# Patient Record
Sex: FEMALE | Race: BLACK OR AFRICAN AMERICAN | NOT HISPANIC OR LATINO | Employment: UNEMPLOYED | URBAN - METROPOLITAN AREA
[De-identification: names, ages, dates, MRNs, and addresses within clinical notes are randomized per-mention and may not be internally consistent; named-entity substitution may affect disease eponyms.]

---

## 2022-03-28 ENCOUNTER — OFFICE VISIT (OUTPATIENT)
Dept: OPHTHALMOLOGY | Facility: CLINIC | Age: 77
End: 2022-03-28
Payer: COMMERCIAL

## 2022-03-28 DIAGNOSIS — H10.13 ALLERGIC CONJUNCTIVITIS OF BOTH EYES: ICD-10-CM

## 2022-03-28 DIAGNOSIS — H25.13 NUCLEAR SCLEROTIC CATARACT OF BOTH EYES: ICD-10-CM

## 2022-03-28 DIAGNOSIS — E11.9 DIABETES MELLITUS WITHOUT COMPLICATION: Primary | ICD-10-CM

## 2022-03-28 PROCEDURE — 99999 PR PBB SHADOW E&M-NEW PATIENT-LVL II: ICD-10-PCS | Mod: PBBFAC,HCWC,, | Performed by: OPTOMETRIST

## 2022-03-28 PROCEDURE — 92004 COMPRE OPH EXAM NEW PT 1/>: CPT | Mod: HCWC,S$GLB,, | Performed by: OPTOMETRIST

## 2022-03-28 PROCEDURE — 2023F DILAT RTA XM W/O RTNOPTHY: CPT | Mod: HCWC,CPTII,S$GLB, | Performed by: OPTOMETRIST

## 2022-03-28 PROCEDURE — 99999 PR PBB SHADOW E&M-NEW PATIENT-LVL II: CPT | Mod: PBBFAC,HCWC,, | Performed by: OPTOMETRIST

## 2022-03-28 PROCEDURE — 2023F PR DILATED RETINAL EXAM W/O EVID OF RETINOPATHY: ICD-10-PCS | Mod: HCWC,CPTII,S$GLB, | Performed by: OPTOMETRIST

## 2022-03-28 PROCEDURE — 1159F PR MEDICATION LIST DOCUMENTED IN MEDICAL RECORD: ICD-10-PCS | Mod: HCWC,CPTII,S$GLB, | Performed by: OPTOMETRIST

## 2022-03-28 PROCEDURE — 92004 PR EYE EXAM, NEW PATIENT,COMPREHESV: ICD-10-PCS | Mod: HCWC,S$GLB,, | Performed by: OPTOMETRIST

## 2022-03-28 PROCEDURE — 1159F MED LIST DOCD IN RCRD: CPT | Mod: HCWC,CPTII,S$GLB, | Performed by: OPTOMETRIST

## 2022-03-28 RX ORDER — AMLODIPINE BESYLATE 10 MG/1
10 TABLET ORAL DAILY
COMMUNITY

## 2022-03-28 RX ORDER — QUINAPRIL HYDROCHLORIDE AND HYDROCHLOROTHIAZIDE 20; 12.5 MG/1; MG/1
1 TABLET, FILM COATED ORAL DAILY
COMMUNITY

## 2022-03-28 RX ORDER — POTASSIUM CHLORIDE 20 MEQ/1
20 TABLET, EXTENDED RELEASE ORAL 2 TIMES DAILY
COMMUNITY

## 2022-03-28 RX ORDER — METFORMIN HYDROCHLORIDE 1000 MG/1
1000 TABLET ORAL 2 TIMES DAILY WITH MEALS
COMMUNITY

## 2022-03-28 RX ORDER — TRAMADOL HYDROCHLORIDE 50 MG/1
50 TABLET ORAL EVERY 6 HOURS PRN
COMMUNITY

## 2022-03-28 RX ORDER — ROSUVASTATIN CALCIUM 20 MG/1
20 TABLET, COATED ORAL DAILY
COMMUNITY

## 2022-03-28 RX ORDER — ASPIRIN 81 MG/1
81 TABLET ORAL DAILY
COMMUNITY

## 2022-03-28 RX ORDER — FERROUS GLUCONATE 324(38)MG
324 TABLET ORAL
COMMUNITY

## 2022-03-28 RX ORDER — CARVEDILOL 25 MG/1
25 TABLET ORAL 2 TIMES DAILY WITH MEALS
COMMUNITY

## 2022-03-28 NOTE — PROGRESS NOTES
HPI     NP to DKT  Patient here today for yearly eye exam  Diagnosed with diabetes in 2005  No results found for: LABA1C, HGBA1C    Vision changes since last eye exam?: None noticed  Used to wear glasses last pair broken     Any eye pain today: No    Other ocular symptoms: OS irritation due to pollen    Interested in contact lens fitting today? No                Last edited by Chika Zarate, PCT on 3/28/2022  1:07 PM. (History)              Assessment /Plan     For exam results, see Encounter Report.    Allergic conjunctivitis of both eyes  Recommend OTC pataday 1 drop in the morning as needed.     Nuclear sclerotic cataract of both eyes  Visually significant cataract.  Patient is at the option stage.   Cataract surgery will improve vision, but necessity is dependent on patient's symptoms.   Pt declines surgical consult at this time.  Will continue to monitor over the next 12 months. Pt to call or RTC with any significant change in vision prior to next visit.    Diabetes mellitus without complication  No retinopathy, continue strict bp/bs control.    RTC 1 year for CEE with DFE sooner if any changes to vision or worsening symptoms.

## 2023-08-25 ENCOUNTER — HOSPITAL ENCOUNTER (EMERGENCY)
Facility: HOSPITAL | Age: 78
Discharge: HOME | End: 2023-08-25
Attending: INTERNAL MEDICINE
Payer: MEDICARE

## 2023-08-25 VITALS
OXYGEN SATURATION: 99 % | WEIGHT: 145 LBS | HEIGHT: 62 IN | HEART RATE: 84 BPM | SYSTOLIC BLOOD PRESSURE: 187 MMHG | DIASTOLIC BLOOD PRESSURE: 99 MMHG | BODY MASS INDEX: 26.68 KG/M2 | RESPIRATION RATE: 17 BRPM | TEMPERATURE: 99 F

## 2023-08-25 DIAGNOSIS — M79.602 LEFT ARM PAIN: ICD-10-CM

## 2023-08-25 DIAGNOSIS — M79.632 LEFT FOREARM PAIN: ICD-10-CM

## 2023-08-25 PROCEDURE — 25000003 PHARM REV CODE 250: Mod: HCWC,ER | Performed by: INTERNAL MEDICINE

## 2023-08-25 PROCEDURE — 99284 EMERGENCY DEPT VISIT MOD MDM: CPT | Mod: HCWC,ER

## 2023-08-25 RX ORDER — MELOXICAM 15 MG/1
15 TABLET ORAL DAILY
Qty: 30 TABLET | Refills: 0 | Status: SHIPPED | OUTPATIENT
Start: 2023-08-25

## 2023-08-25 RX ORDER — CARVEDILOL 25 MG/1
25 TABLET ORAL 2 TIMES DAILY WITH MEALS
Qty: 20 TABLET | Refills: 0 | Status: SHIPPED | OUTPATIENT
Start: 2023-08-25 | End: 2024-08-24

## 2023-08-25 RX ORDER — AMLODIPINE BESYLATE 10 MG/1
10 TABLET ORAL DAILY
Qty: 10 TABLET | Refills: 0 | Status: SHIPPED | OUTPATIENT
Start: 2023-08-25 | End: 2024-08-24

## 2023-08-25 RX ORDER — IBUPROFEN 600 MG/1
600 TABLET ORAL
Status: COMPLETED | OUTPATIENT
Start: 2023-08-25 | End: 2023-08-25

## 2023-08-25 RX ADMIN — IBUPROFEN 600 MG: 600 TABLET ORAL at 07:08

## 2023-08-26 NOTE — ED PROVIDER NOTES
Encounter Date: 8/25/2023       History     Chief Complaint   Patient presents with    Arm Injury     Pt presents to the ED with complaint of L arm pain after pulling up a head rest in a car approx 1 hr PTA.     78-year-old female presents to the emergency department complaining of left upper arm and forearm pain after pulling headboard on her car today.  She denies fever/chills/nausea/vomiting/chest pain/shortness of breath.    The history is provided by the patient. No  was used.     Review of patient's allergies indicates:  No Known Allergies  No past medical history on file.  No past surgical history on file.  No family history on file.     Review of Systems   Constitutional:  Negative for chills and fever.   Respiratory:  Negative for shortness of breath.    Cardiovascular:  Negative for chest pain.   Musculoskeletal:         Left arm and forearm pain   All other systems reviewed and are negative.      Physical Exam     Initial Vitals   BP Pulse Resp Temp SpO2   08/25/23 1748 08/25/23 1747 08/25/23 1747 08/25/23 1747 08/25/23 1747   (!) 166/83 83 18 98.7 °F (37.1 °C) 100 %      MAP       --                Physical Exam    Nursing note and vitals reviewed.  Constitutional: She is not diaphoretic. No distress.   HENT:   Head: Normocephalic and atraumatic.   Eyes: Conjunctivae are normal.   Neck:   Normal range of motion.  Cardiovascular:  Normal rate, regular rhythm and intact distal pulses.           Pulmonary/Chest: Breath sounds normal.   Abdominal: Abdomen is soft. Bowel sounds are normal. There is no abdominal tenderness.   Musculoskeletal:         General: No tenderness or edema.      Cervical back: Normal range of motion.      Comments: Left biceps and forearm pain upon movement without tenderness to palpation.  Bilateral upper extremities are neurovascularly intact.     Neurological: She is alert. She has normal strength.   Skin: Skin is warm. Capillary refill takes less than 2  seconds.   Psychiatric: She has a normal mood and affect. Thought content normal.         ED Course   Procedures  Labs Reviewed - No data to display       Imaging Results              X-Ray Humerus 2 View Left (Final result)  Result time 08/25/23 20:01:52      Final result by Daniela Hyman MD (08/25/23 20:01:52)                   Impression:      No acute displaced fracture seen.      Electronically signed by: Daniela Hyman MD  Date:    08/25/2023  Time:    20:01               Narrative:    EXAMINATION:  XR HUMERUS 2 VIEW LEFT; XR FOREARM LEFT    CLINICAL HISTORY:  Pain in left arm; Pain in left forearm    TECHNIQUE:  Left humerus AP and lateral.  Left forearm AP and lateral.    COMPARISON:  None    FINDINGS:  No evidence of acute displaced fracture, dislocation, or osseous destructive process.  Minor degenerative changes are seen at the acromioclavicular joint.  There is posterior olecranon spurring.  No significant elbow joint effusion seen.  No radiopaque retained foreign body seen.                                       X-Ray Forearm Left (Final result)  Result time 08/25/23 20:01:52      Final result by Daniela Hyman MD (08/25/23 20:01:52)                   Impression:      No acute displaced fracture seen.      Electronically signed by: Daniela Hyman MD  Date:    08/25/2023  Time:    20:01               Narrative:    EXAMINATION:  XR HUMERUS 2 VIEW LEFT; XR FOREARM LEFT    CLINICAL HISTORY:  Pain in left arm; Pain in left forearm    TECHNIQUE:  Left humerus AP and lateral.  Left forearm AP and lateral.    COMPARISON:  None    FINDINGS:  No evidence of acute displaced fracture, dislocation, or osseous destructive process.  Minor degenerative changes are seen at the acromioclavicular joint.  There is posterior olecranon spurring.  No significant elbow joint effusion seen.  No radiopaque retained foreign body seen.                                       Medications   ibuprofen tablet 600 mg (600 mg Oral  Given 8/25/23 1946)     Medical Decision Making  78-year-old female presents to the emergency department complaining of left upper arm and forearm pain after pulling headboard on her car today.  She denies fever/chills/nausea/vomiting/chest pain/shortness of breath.    X-rays of left upper and forearm revealed no acute abnormalities.  Patient was given instructions for left arm strain and received ibuprofen in the emergency department as well as a prescription for Mobic.  She was advised to follow-up with her primary care physician within the next week for re-evaluation/return to the emergency department if condition worsens.    Amount and/or Complexity of Data Reviewed  External Data Reviewed: radiology.  Radiology: ordered.    Risk  Prescription drug management.                               Clinical Impression:   Final diagnoses:  [M79.602] Left arm pain  [M79.632] Left forearm pain        ED Disposition Condition    Discharge Stable          ED Prescriptions       Medication Sig Dispense Start Date End Date Auth. Provider    meloxicam (MOBIC) 15 MG tablet Take 1 tablet (15 mg total) by mouth once daily. 30 tablet 8/25/2023 -- Solomon Pillai MD    amLODIPine (NORVASC) 10 MG tablet Take 1 tablet (10 mg total) by mouth once daily. 10 tablet 8/25/2023 8/24/2024 Solomon Pillai MD    carvediloL (COREG) 25 MG tablet Take 1 tablet (25 mg total) by mouth 2 (two) times daily with meals. 20 tablet 8/25/2023 8/24/2024 Solomon Pillai MD          Follow-up Information    None          Solomon Pillai MD  08/25/23 2006

## 2024-03-12 ENCOUNTER — TELEPHONE (OUTPATIENT)
Dept: OPHTHALMOLOGY | Facility: CLINIC | Age: 79
End: 2024-03-12
Payer: MEDICARE

## 2024-03-12 NOTE — TELEPHONE ENCOUNTER
----- Message from Peter Pulido sent at 3/12/2024  1:55 PM CDT -----  Regarding: pt callback  Name of Who is Calling:Pt         What is the request in detail: Requesting reschedule for regine on tomorrow please advise           Can the clinic reply by [facility]: no         What Number to Call Back [facility] not [facility]: Telephone Information:  Edutor          [phone]

## 2024-03-20 ENCOUNTER — OFFICE VISIT (OUTPATIENT)
Dept: OPHTHALMOLOGY | Facility: CLINIC | Age: 79
End: 2024-03-20
Payer: MEDICARE

## 2024-03-20 DIAGNOSIS — H52.4 MYOPIA WITH ASTIGMATISM AND PRESBYOPIA, BILATERAL: ICD-10-CM

## 2024-03-20 DIAGNOSIS — E11.36 DIABETIC CATARACT: ICD-10-CM

## 2024-03-20 DIAGNOSIS — H40.1134 PRIMARY OPEN ANGLE GLAUCOMA (POAG) OF BOTH EYES, INDETERMINATE STAGE: Primary | ICD-10-CM

## 2024-03-20 DIAGNOSIS — E11.9 DIABETES MELLITUS WITHOUT COMPLICATION: ICD-10-CM

## 2024-03-20 DIAGNOSIS — H52.203 MYOPIA WITH ASTIGMATISM AND PRESBYOPIA, BILATERAL: ICD-10-CM

## 2024-03-20 DIAGNOSIS — H40.053 BILATERAL OCULAR HYPERTENSION: ICD-10-CM

## 2024-03-20 DIAGNOSIS — H52.13 MYOPIA WITH ASTIGMATISM AND PRESBYOPIA, BILATERAL: ICD-10-CM

## 2024-03-20 PROCEDURE — 99214 OFFICE O/P EST MOD 30 MIN: CPT | Mod: HCWC,S$GLB,, | Performed by: OPTOMETRIST

## 2024-03-20 PROCEDURE — 92015 DETERMINE REFRACTIVE STATE: CPT | Mod: HCWC,S$GLB,, | Performed by: OPTOMETRIST

## 2024-03-20 PROCEDURE — 92133 CPTRZD OPH DX IMG PST SGM ON: CPT | Mod: HCWC,S$GLB,, | Performed by: OPTOMETRIST

## 2024-03-20 PROCEDURE — 1159F MED LIST DOCD IN RCRD: CPT | Mod: HCWC,CPTII,S$GLB, | Performed by: OPTOMETRIST

## 2024-03-20 PROCEDURE — 99999 PR PBB SHADOW E&M-EST. PATIENT-LVL II: CPT | Mod: PBBFAC,HCWC,, | Performed by: OPTOMETRIST

## 2024-03-20 PROCEDURE — 2023F DILAT RTA XM W/O RTNOPTHY: CPT | Mod: HCWC,CPTII,S$GLB, | Performed by: OPTOMETRIST

## 2024-03-20 RX ORDER — DORZOLAMIDE HYDROCHLORIDE AND TIMOLOL MALEATE 20; 5 MG/ML; MG/ML
1 SOLUTION/ DROPS OPHTHALMIC 2 TIMES DAILY
Qty: 10 ML | Refills: 1 | Status: SHIPPED | OUTPATIENT
Start: 2024-03-20 | End: 2024-05-20 | Stop reason: SDUPTHER

## 2024-03-20 NOTE — PROGRESS NOTES
HPI     Diabetic Eye Exam            Comments: Patient here today for yearly eye exam          Comments    Diagnosed with diabetes in 2005  Lab Results       Component                Value               Date                       HGBA1C                   6.3                 02/05/2024            Vision changes since last eye exam?: None noticed   OTC readers    Any eye pain today: No    Other ocular symptoms: No    Interested in contact lens fitting today? No                      Last edited by Chika Zarate, PCT on 3/20/2024  2:40 PM.            Assessment /Plan     For exam results, see Encounter Report.    Primary open angle glaucoma (POAG) of both eyes, indeterminate stage  New on exam today OS>OD, RTC next available for HVF 24-2 and glaucoma staging.     Bilateral ocular hypertension  -     dorzolamide-timolol 2-0.5% (COSOPT) 22.3-6.8 mg/mL ophthalmic solution; Place 1 drop into both eyes 2 (two) times daily.  Dispense: 10 mL; Refill: 1  Start Cosopt BID, RTC 3 weeks for IOP check, consider latanoprost if IOP remains elevated.     Diabetes mellitus without complication  19 years, last A1c 6.3 There was no diabetic retinopathy present on either eye on examination today. Recommend good blood pressure control, strict blood glucose control, and good cholesterol control.  Continue close care with PCP regarding diabetes.    Diabetic cataract  Cataracts are not visually significant and not affecting activities of daily living. Annual observation is recommended at this time. Patient to call or return to clinic with any significant change in vision prior to next visit.    Myopia with astigmatism and presbyopia, bilateral  Eyeglass Final Rx       Eyeglass Final Rx         Sphere Cylinder Axis Add    Right -0.25 +0.75 045 +2.50    Left -0.25 +1.50 055 +2.50      Type: PAL    Expiration Date: 3/20/2025                    RTC in 3 weeks for full glaucoma workup (HVF 24-2, IOP check, Pachymetry, Gonioscopy), sooner  if changes to vision or worsening symptoms.  Discussed above and answered questions.

## 2024-05-20 ENCOUNTER — OFFICE VISIT (OUTPATIENT)
Dept: OPHTHALMOLOGY | Facility: CLINIC | Age: 79
End: 2024-05-20
Payer: MEDICARE

## 2024-05-20 DIAGNOSIS — H52.4 MYOPIA WITH ASTIGMATISM AND PRESBYOPIA, BILATERAL: ICD-10-CM

## 2024-05-20 DIAGNOSIS — E11.36 DIABETIC CATARACT: ICD-10-CM

## 2024-05-20 DIAGNOSIS — H52.13 MYOPIA WITH ASTIGMATISM AND PRESBYOPIA, BILATERAL: ICD-10-CM

## 2024-05-20 DIAGNOSIS — E11.9 DIABETES MELLITUS WITHOUT COMPLICATION: ICD-10-CM

## 2024-05-20 DIAGNOSIS — H40.1122 PRIMARY OPEN ANGLE GLAUCOMA (POAG) OF LEFT EYE, MODERATE STAGE: Primary | ICD-10-CM

## 2024-05-20 DIAGNOSIS — H52.203 MYOPIA WITH ASTIGMATISM AND PRESBYOPIA, BILATERAL: ICD-10-CM

## 2024-05-20 DIAGNOSIS — H40.1111 PRIMARY OPEN ANGLE GLAUCOMA (POAG) OF RIGHT EYE, MILD STAGE: ICD-10-CM

## 2024-05-20 PROCEDURE — 92020 GONIOSCOPY: CPT | Mod: HCWC,S$GLB,, | Performed by: OPTOMETRIST

## 2024-05-20 PROCEDURE — 1159F MED LIST DOCD IN RCRD: CPT | Mod: HCWC,CPTII,S$GLB, | Performed by: OPTOMETRIST

## 2024-05-20 PROCEDURE — 99999 PR PBB SHADOW E&M-EST. PATIENT-LVL II: CPT | Mod: PBBFAC,HCWC,, | Performed by: OPTOMETRIST

## 2024-05-20 PROCEDURE — 92083 EXTENDED VISUAL FIELD XM: CPT | Mod: HCWC,S$GLB,, | Performed by: OPTOMETRIST

## 2024-05-20 PROCEDURE — 99213 OFFICE O/P EST LOW 20 MIN: CPT | Mod: HCWC,S$GLB,, | Performed by: OPTOMETRIST

## 2024-05-20 PROCEDURE — 76514 ECHO EXAM OF EYE THICKNESS: CPT | Mod: HCWC,S$GLB,, | Performed by: OPTOMETRIST

## 2024-05-20 RX ORDER — DORZOLAMIDE HYDROCHLORIDE AND TIMOLOL MALEATE 20; 5 MG/ML; MG/ML
1 SOLUTION/ DROPS OPHTHALMIC 2 TIMES DAILY
Qty: 10 ML | Refills: 3 | Status: SHIPPED | OUTPATIENT
Start: 2024-05-20 | End: 2024-06-10 | Stop reason: SDUPTHER

## 2024-05-20 RX ORDER — NETARSUDIL AND LATANOPROST OPHTHALMIC SOLUTION, 0.02%/0.005% .2; .05 MG/ML; MG/ML
1 SOLUTION/ DROPS OPHTHALMIC; TOPICAL NIGHTLY
Qty: 2.5 ML | Refills: 3 | Status: SHIPPED | OUTPATIENT
Start: 2024-05-20 | End: 2024-05-22 | Stop reason: RX

## 2024-05-20 NOTE — PROGRESS NOTES
HPI     Glaucoma            Comments: Pt reports for HVF, IOP, PACH, gonio. Denies any pain or   irritation. Va stable. 100% compliant with gtts.           Comments    1. DM dx 2005    Cosopt BID OU             Last edited by Stepan Klein on 5/20/2024  9:25 AM.            Assessment /Plan     For exam results, see Encounter Report.    1. Primary open angle glaucoma (POAG) of left eye, moderate stage  -     netarsudiL-latanoprost (ROCKLATAN) 0.02-0.005 % Drop; Apply 1 drop to eye every evening.  Dispense: 2.5 mL; Refill: 3  -     dorzolamide-timolol 2-0.5% (COSOPT) 22.3-6.8 mg/mL ophthalmic solution; Place 1 drop into both eyes 2 (two) times daily.  Dispense: 10 mL; Refill: 3  -     Lau Visual Field - OU - Extended - Both Eyes  Family history  Unknown  Glaucoma meds   Cosopt BID OU, start Rocklatan QHS OU  H/O adverse rxn to glaucoma drops  None  LASERS  None  GLAUCOMA SURGERIES  None  OTHER EYE SURGERIES   None  CDR  : 0.25/0.6  Tmax      32/33  Ttarget   14/14  HVF  05/20/2024  Gono  05/20/24 Open to CBB   CCT  562/536 05/20/2024  OCT  03/20/2024     Ttoday 22.5/24  Plan Continue Cosopt BID OU, add Rocklatan QHS OU. If no improvement from Rocklantan, consider referral for SLT.     2. Primary open angle glaucoma (POAG) of right eye, mild stage  -     netarsudiL-latanoprost (ROCKLATAN) 0.02-0.005 % Drop; Apply 1 drop to eye every evening.  Dispense: 2.5 mL; Refill: 3  -     dorzolamide-timolol 2-0.5% (COSOPT) 22.3-6.8 mg/mL ophthalmic solution; Place 1 drop into both eyes 2 (two) times daily.  Dispense: 10 mL; Refill: 3  -     Lau Visual Field - OU - Extended - Both Eyes  See # 1.    3. Diabetes mellitus without complication  Stable, recommend good blood pressure control, strict blood glucose control, and good cholesterol control.  Continue close care with PCP regarding diabetes.    4. Diabetic cataract  Cataracts are not visually significant and not affecting activities of daily living. Annual  observation is recommended at this time. Patient to call or return to clinic with any significant change in vision prior to next visit.    5. Myopia with astigmatism and presbyopia, bilateral  Continue wearing current Mrx.     RTC in 3 weeks for IOP check, sooner if changes to vision or worsening symptoms.  Discussed above and answered questions.

## 2024-05-22 DIAGNOSIS — H40.1122 PRIMARY OPEN ANGLE GLAUCOMA (POAG) OF LEFT EYE, MODERATE STAGE: Primary | ICD-10-CM

## 2024-05-22 DIAGNOSIS — H40.1111 PRIMARY OPEN ANGLE GLAUCOMA (POAG) OF RIGHT EYE, MILD STAGE: ICD-10-CM

## 2024-05-22 RX ORDER — LATANOPROST 50 UG/ML
1 SOLUTION/ DROPS OPHTHALMIC NIGHTLY
Qty: 2.5 ML | Refills: 2 | Status: SHIPPED | OUTPATIENT
Start: 2024-05-22 | End: 2024-06-10 | Stop reason: ALTCHOICE

## 2024-05-22 NOTE — PROGRESS NOTES
Assessment /Plan     For exam results, see Encounter Report.    Primary open angle glaucoma (POAG) of left eye, moderate stage    Primary open angle glaucoma (POAG) of right eye, mild stage    Other orders  -     latanoprost 0.005 % ophthalmic solution; Place 1 drop into both eyes every evening.  Dispense: 2.5 mL; Refill: 2    Switched medication from Rocklatan QHS to Latanoprost QHS due to availability. RTC as scheduled for IOP check. If IOP still elevated at visit will Rx rockaltan.     Use:  Cosopt BID OU  Rocklatan QHS OU.     RTC as scheduled for annual eye exam, sooner if any changes to vision worsening symptoms.

## 2024-06-10 ENCOUNTER — OFFICE VISIT (OUTPATIENT)
Dept: OPHTHALMOLOGY | Facility: CLINIC | Age: 79
End: 2024-06-10
Payer: MEDICARE

## 2024-06-10 DIAGNOSIS — H52.203 MYOPIA WITH ASTIGMATISM AND PRESBYOPIA, BILATERAL: ICD-10-CM

## 2024-06-10 DIAGNOSIS — H40.1122 PRIMARY OPEN ANGLE GLAUCOMA (POAG) OF LEFT EYE, MODERATE STAGE: Primary | ICD-10-CM

## 2024-06-10 DIAGNOSIS — H52.13 MYOPIA WITH ASTIGMATISM AND PRESBYOPIA, BILATERAL: ICD-10-CM

## 2024-06-10 DIAGNOSIS — H40.1111 PRIMARY OPEN ANGLE GLAUCOMA (POAG) OF RIGHT EYE, MILD STAGE: ICD-10-CM

## 2024-06-10 DIAGNOSIS — E11.9 DIABETES MELLITUS WITHOUT COMPLICATION: ICD-10-CM

## 2024-06-10 DIAGNOSIS — E11.36 DIABETIC CATARACT: ICD-10-CM

## 2024-06-10 DIAGNOSIS — H52.4 MYOPIA WITH ASTIGMATISM AND PRESBYOPIA, BILATERAL: ICD-10-CM

## 2024-06-10 PROCEDURE — 1159F MED LIST DOCD IN RCRD: CPT | Mod: HCWC,CPTII,S$GLB, | Performed by: OPTOMETRIST

## 2024-06-10 PROCEDURE — 99213 OFFICE O/P EST LOW 20 MIN: CPT | Mod: HCWC,S$GLB,, | Performed by: OPTOMETRIST

## 2024-06-10 PROCEDURE — 99999 PR PBB SHADOW E&M-EST. PATIENT-LVL III: CPT | Mod: PBBFAC,HCWC,, | Performed by: OPTOMETRIST

## 2024-06-10 RX ORDER — NETARSUDIL AND LATANOPROST OPHTHALMIC SOLUTION, 0.02%/0.005% .2; .05 MG/ML; MG/ML
1 SOLUTION/ DROPS OPHTHALMIC; TOPICAL NIGHTLY
Qty: 2.5 ML | Refills: 1 | Status: SHIPPED | OUTPATIENT
Start: 2024-06-10 | End: 2024-07-10

## 2024-06-10 RX ORDER — DORZOLAMIDE HYDROCHLORIDE AND TIMOLOL MALEATE 20; 5 MG/ML; MG/ML
1 SOLUTION/ DROPS OPHTHALMIC 2 TIMES DAILY
Qty: 10 ML | Refills: 3 | Status: SHIPPED | OUTPATIENT
Start: 2024-06-10 | End: 2025-06-10

## 2024-06-10 NOTE — PATIENT INSTRUCTIONS
Discontinue Latanoprost.  Continue Cosopt twice daily to BOTH eyes.   Start Rocklatan, instill one drop daily to BOTH eyes.

## 2024-06-10 NOTE — PROGRESS NOTES
HPI    1. DM dx 2005    Cosopt BID OU  Latanoprost qpm OU       Vision changes since last eye exam?: Pt states VA is stable and has no   complaints of any new vision changes since her last eye exam. Pt would   like to be advised on what pharmacy she could switch to get her Cosopt   because [facility] has run out of it per pt. Pt has been using Latanoprost   and Cosopt as directed per pt.     Any eye pain today: No.    Other ocular symptoms: None noticed by pt.        Last edited by Ara Monte on 6/10/2024  2:10 PM.            Assessment /Plan     For exam results, see Encounter Report.    1. Primary open angle glaucoma (POAG) of left eye, moderate stage   Primary open angle glaucoma (POAG) of right eye, mild stage  -     netarsudiL-latanoprost (ROCKLATAN) 0.02-0.005 % Drop; Apply 1 drop to eye every evening.  Dispense: 2.5 mL; Refill: 1  -     dorzolamide-timolol 2-0.5% (COSOPT) 22.3-6.8 mg/mL ophthalmic solution; Place 1 drop into both eyes 2 (two) times daily.  Dispense: 10 mL; Refill: 3  Family history  Unknown  Glaucoma meds   Cosopt BID OU, start Rocklatan QHS OU  H/O adverse rxn to glaucoma drops  None  LASERS  None  GLAUCOMA SURGERIES  None  OTHER EYE SURGERIES   None  CDR  : 0.25/0.6  Tmax      32/33  Ttarget   14/14  HVF  05/20/2024  Gono  05/20/24 Open to CBB   CCT  562/536 05/20/2024  OCT  03/20/2024     Ttoday 19/22  Plan Continue Cosopt BID OU, unable to get Rocklatan due to availability at last visit. Sent to pharmacy at the [facility]. If no improvement from Rocklatan, consider referral for SLT.     3. Diabetes mellitus without complication  Stable, recommend good blood pressure control, strict blood glucose control, and good cholesterol control.  Continue close care with PCP regarding diabetes.     4. Diabetic cataract  Cataracts are not visually significant, observe.    5. Myopia with astigmatism and presbyopia, bilateral  Continue wearing current RX.       RTC in 3 weeks for IOP check, sooner if changes  to vision or worsening symptoms.  Discussed above and answered questions.     Cosopt BID OU  Rocklatan QHS OU

## 2024-07-29 ENCOUNTER — OFFICE VISIT (OUTPATIENT)
Dept: OPHTHALMOLOGY | Facility: CLINIC | Age: 79
End: 2024-07-29
Payer: MEDICARE

## 2024-07-29 DIAGNOSIS — H40.1122 PRIMARY OPEN ANGLE GLAUCOMA (POAG) OF LEFT EYE, MODERATE STAGE: ICD-10-CM

## 2024-07-29 DIAGNOSIS — E11.9 DIABETES MELLITUS WITHOUT COMPLICATION: ICD-10-CM

## 2024-07-29 DIAGNOSIS — E11.36 DIABETIC CATARACT: ICD-10-CM

## 2024-07-29 DIAGNOSIS — H52.4 MYOPIA WITH ASTIGMATISM AND PRESBYOPIA, BILATERAL: ICD-10-CM

## 2024-07-29 DIAGNOSIS — H52.13 MYOPIA WITH ASTIGMATISM AND PRESBYOPIA, BILATERAL: ICD-10-CM

## 2024-07-29 DIAGNOSIS — H40.1111 PRIMARY OPEN ANGLE GLAUCOMA (POAG) OF RIGHT EYE, MILD STAGE: Primary | ICD-10-CM

## 2024-07-29 DIAGNOSIS — H52.203 MYOPIA WITH ASTIGMATISM AND PRESBYOPIA, BILATERAL: ICD-10-CM

## 2024-07-29 PROCEDURE — 99213 OFFICE O/P EST LOW 20 MIN: CPT | Mod: HCWC,S$GLB,, | Performed by: OPTOMETRIST

## 2024-07-29 PROCEDURE — 99999 PR PBB SHADOW E&M-EST. PATIENT-LVL II: CPT | Mod: PBBFAC,HCWC,, | Performed by: OPTOMETRIST

## 2024-07-29 PROCEDURE — 1159F MED LIST DOCD IN RCRD: CPT | Mod: HCWC,CPTII,S$GLB, | Performed by: OPTOMETRIST

## 2024-07-29 RX ORDER — DORZOLAMIDE HYDROCHLORIDE AND TIMOLOL MALEATE 20; 5 MG/ML; MG/ML
1 SOLUTION/ DROPS OPHTHALMIC 2 TIMES DAILY
Qty: 10 ML | Refills: 3 | Status: SHIPPED | OUTPATIENT
Start: 2024-07-29 | End: 2025-07-29

## 2024-07-29 RX ORDER — NETARSUDIL AND LATANOPROST OPHTHALMIC SOLUTION, 0.02%/0.005% .2; .05 MG/ML; MG/ML
1 SOLUTION/ DROPS OPHTHALMIC; TOPICAL NIGHTLY
Qty: 2.5 ML | Refills: 2 | Status: SHIPPED | OUTPATIENT
Start: 2024-07-29 | End: 2024-07-29

## 2024-07-29 RX ORDER — DORZOLAMIDE HYDROCHLORIDE AND TIMOLOL MALEATE 20; 5 MG/ML; MG/ML
1 SOLUTION/ DROPS OPHTHALMIC 2 TIMES DAILY
Qty: 10 ML | Refills: 3 | Status: SHIPPED | OUTPATIENT
Start: 2024-07-29 | End: 2024-07-29

## 2024-07-29 RX ORDER — NETARSUDIL AND LATANOPROST OPHTHALMIC SOLUTION, 0.02%/0.005% .2; .05 MG/ML; MG/ML
1 SOLUTION/ DROPS OPHTHALMIC; TOPICAL NIGHTLY
Qty: 2.5 ML | Refills: 3 | Status: SHIPPED | OUTPATIENT
Start: 2024-07-29 | End: 2024-10-27

## 2024-08-05 RX ORDER — LATANOPROST 50 UG/ML
1 SOLUTION/ DROPS OPHTHALMIC NIGHTLY
Qty: 2.5 ML | Refills: 2 | OUTPATIENT
Start: 2024-08-05

## 2024-11-25 ENCOUNTER — OFFICE VISIT (OUTPATIENT)
Dept: OPHTHALMOLOGY | Facility: CLINIC | Age: 79
End: 2024-11-25
Payer: MEDICARE

## 2024-11-25 ENCOUNTER — TELEPHONE (OUTPATIENT)
Dept: OPHTHALMOLOGY | Facility: CLINIC | Age: 79
End: 2024-11-25
Payer: MEDICARE

## 2024-11-25 DIAGNOSIS — H40.1122 PRIMARY OPEN ANGLE GLAUCOMA (POAG) OF LEFT EYE, MODERATE STAGE: Primary | ICD-10-CM

## 2024-11-25 DIAGNOSIS — E11.36 DIABETIC CATARACT: ICD-10-CM

## 2024-11-25 DIAGNOSIS — E11.9 DIABETES MELLITUS WITHOUT COMPLICATION: ICD-10-CM

## 2024-11-25 DIAGNOSIS — H40.1111 PRIMARY OPEN ANGLE GLAUCOMA (POAG) OF RIGHT EYE, MILD STAGE: ICD-10-CM

## 2024-11-25 PROCEDURE — 1159F MED LIST DOCD IN RCRD: CPT | Mod: HCWC,CPTII,S$GLB, | Performed by: OPTOMETRIST

## 2024-11-25 PROCEDURE — 99214 OFFICE O/P EST MOD 30 MIN: CPT | Mod: HCWC,S$GLB,, | Performed by: OPTOMETRIST

## 2024-11-25 PROCEDURE — 92083 EXTENDED VISUAL FIELD XM: CPT | Mod: HCWC,S$GLB,, | Performed by: OPTOMETRIST

## 2024-11-25 PROCEDURE — 99999 PR PBB SHADOW E&M-EST. PATIENT-LVL II: CPT | Mod: PBBFAC,HCWC,, | Performed by: OPTOMETRIST

## 2024-11-25 PROCEDURE — 1160F RVW MEDS BY RX/DR IN RCRD: CPT | Mod: HCWC,CPTII,S$GLB, | Performed by: OPTOMETRIST

## 2024-11-25 PROCEDURE — 92133 CPTRZD OPH DX IMG PST SGM ON: CPT | Mod: HCWC,S$GLB,, | Performed by: OPTOMETRIST

## 2024-11-25 RX ORDER — DORZOLAMIDE HYDROCHLORIDE AND TIMOLOL MALEATE 20; 5 MG/ML; MG/ML
1 SOLUTION/ DROPS OPHTHALMIC 2 TIMES DAILY
Qty: 10 ML | Refills: 3 | Status: SHIPPED | OUTPATIENT
Start: 2024-11-25 | End: 2024-11-26

## 2024-11-25 RX ORDER — NETARSUDIL AND LATANOPROST OPHTHALMIC SOLUTION, 0.02%/0.005% .2; .05 MG/ML; MG/ML
1 SOLUTION/ DROPS OPHTHALMIC; TOPICAL NIGHTLY
Qty: 2.5 ML | Refills: 1 | Status: SHIPPED | OUTPATIENT
Start: 2024-11-25 | End: 2024-11-26

## 2024-11-25 NOTE — PROGRESS NOTES
HPI     Follow-up            Comments: Pt reports for 3 months RTC for IOP check w/ HVF 24-2. No pain   or irritation. Va stable. Pt is 100% compliant with gtts.          Comments    1. DM dx 2005    Cosopt BID OU  Rocklatan QHS OU             Last edited by Yris Barber on 11/25/2024  3:26 PM.            Assessment /Plan     For exam results, see Encounter Report.    1. Primary open angle glaucoma (POAG) of left eye, moderate stage  Primary open angle glaucoma (POAG) of right eye, mild stage  -     OCT, Optic Nerve - OU - Both Eyes  -     Lau Visual Field - OU - Extended - Both Eyes  -     dorzolamide-timolol 2-0.5% (COSOPT) 22.3-6.8 mg/mL ophthalmic solution; Place 1 drop into both eyes 2 (two) times daily  Dispense: 10 mL; Refill: 3  -     netarsudiL-latanoprost (ROCKLATAN) 0.02-0.005 % Drop; Apply 1 drop to eye every evening.  Dispense: 2.5 mL; Refill: 1  Family history  Unknown  Glaucoma meds   Cosopt BID OU, start Rocklatan QHS OU  H/O adverse rxn to glaucoma drops  None  LASERS  None  GLAUCOMA SURGERIES  None  OTHER EYE SURGERIES   None  CDR  : 0.25/0.6  Tmax      32/33  Ttarget   14/14  HVF  11/25/2024  Gono  05/20/24 Open to CBB   CCT  562/536 05/20/2024  OCT  11/25/2024     Ttoday 20/24  Plan IOP not at target, reviewed drop instillation technique in office today. Pt possibly missing eye drops. Continue Rocklatan QHS OU Cosopt BID OU. RTC 1 month for IOP check, consider MGM consult if IOP remains elevated.     2. Diabetes mellitus without complication  Stable, recommend good blood pressure control, strict blood glucose control, and good cholesterol control.  Continue close care with Dr. Sena regarding diabetes.     3. Diabetic cataract  Observe.        RTC in 1 month for IOP check, sooner if changes to vision or worsening symptoms.  Discussed above and answered questions.

## 2024-11-25 NOTE — TELEPHONE ENCOUNTER
Spoke with patient she was on [address] at 1:57pm she wanted to inform you that traffic is moving slow       Jackie       ----- Message from Juju sent at 11/25/2024  1:45 PM CST -----  Contact: Pt [phone]  [email or web address]     Patient is calling for Medical Advice regarding:appt time traffic     Patient wants a call back or thru [facility]:Call back     Comments:Pt would like for nurse to know she's going to be a little due appt time she's in [place] traffic     Please advise patient replies from provider may take up to 48 hours.

## 2024-11-26 RX ORDER — DORZOLAMIDE HYDROCHLORIDE AND TIMOLOL MALEATE 20; 5 MG/ML; MG/ML
1 SOLUTION/ DROPS OPHTHALMIC 2 TIMES DAILY
Qty: 10 ML | Refills: 3 | Status: SHIPPED | OUTPATIENT
Start: 2024-11-26 | End: 2025-11-26

## 2024-11-26 RX ORDER — NETARSUDIL AND LATANOPROST OPHTHALMIC SOLUTION, 0.02%/0.005% .2; .05 MG/ML; MG/ML
1 SOLUTION/ DROPS OPHTHALMIC; TOPICAL NIGHTLY
Qty: 2.5 ML | Refills: 1 | Status: SHIPPED | OUTPATIENT
Start: 2024-11-26 | End: 2024-12-26

## 2025-01-06 ENCOUNTER — OFFICE VISIT (OUTPATIENT)
Dept: OPHTHALMOLOGY | Facility: CLINIC | Age: 80
End: 2025-01-06
Payer: MEDICARE

## 2025-01-06 DIAGNOSIS — H40.1122 PRIMARY OPEN ANGLE GLAUCOMA (POAG) OF LEFT EYE, MODERATE STAGE: Primary | ICD-10-CM

## 2025-01-06 DIAGNOSIS — E11.9 DIABETES MELLITUS WITHOUT COMPLICATION: ICD-10-CM

## 2025-01-06 DIAGNOSIS — H40.1111 PRIMARY OPEN ANGLE GLAUCOMA (POAG) OF RIGHT EYE, MILD STAGE: ICD-10-CM

## 2025-01-06 DIAGNOSIS — E11.36 DIABETIC CATARACT: ICD-10-CM

## 2025-01-06 PROCEDURE — 99999 PR PBB SHADOW E&M-EST. PATIENT-LVL III: CPT | Mod: PBBFAC,HCWC,, | Performed by: OPTOMETRIST

## 2025-01-06 PROCEDURE — 99214 OFFICE O/P EST MOD 30 MIN: CPT | Mod: HCWC,S$GLB,, | Performed by: OPTOMETRIST

## 2025-01-06 PROCEDURE — 1160F RVW MEDS BY RX/DR IN RCRD: CPT | Mod: HCWC,CPTII,S$GLB, | Performed by: OPTOMETRIST

## 2025-01-06 PROCEDURE — 1159F MED LIST DOCD IN RCRD: CPT | Mod: HCWC,CPTII,S$GLB, | Performed by: OPTOMETRIST

## 2025-01-06 RX ORDER — DORZOLAMIDE HYDROCHLORIDE AND TIMOLOL MALEATE 20; 5 MG/ML; MG/ML
1 SOLUTION/ DROPS OPHTHALMIC 2 TIMES DAILY
Qty: 10 ML | Refills: 3 | Status: SHIPPED | OUTPATIENT
Start: 2025-01-06 | End: 2026-01-06

## 2025-01-06 RX ORDER — NETARSUDIL AND LATANOPROST OPHTHALMIC SOLUTION, 0.02%/0.005% .2; .05 MG/ML; MG/ML
1 SOLUTION/ DROPS OPHTHALMIC; TOPICAL NIGHTLY
Qty: 2.5 ML | Refills: 3 | Status: SHIPPED | OUTPATIENT
Start: 2025-01-06 | End: 2025-01-06

## 2025-01-06 RX ORDER — NETARSUDIL AND LATANOPROST OPHTHALMIC SOLUTION, 0.02%/0.005% .2; .05 MG/ML; MG/ML
1 SOLUTION/ DROPS OPHTHALMIC; TOPICAL NIGHTLY
Qty: 2.5 ML | Refills: 3 | Status: SHIPPED | OUTPATIENT
Start: 2025-01-06 | End: 2025-02-05

## 2025-01-06 NOTE — PROGRESS NOTES
HPI     Follow-up            Comments: Pt reports for 1m IOP check. No pain or irritation VA stable.   Pt is 100% compliant with gtts.          Comments    1. DM dx 2005    Cosopt BID OU  Rocklatan QHS OU          Last edited by Yris Barber on 1/6/2025  8:54 AM.            Assessment /Plan     For exam results, see Encounter Report.    1. Primary open angle glaucoma (POAG) of left eye, moderate stage   Primary open angle glaucoma (POAG) of right eye, mild stage  -     Ambulatory referral/consult to Ophthalmology; Future; Expected date: 01/13/2025  -     dorzolamide-timolol 2-0.5% (COSOPT) 22.3-6.8 mg/mL ophthalmic solution; Place 1 drop into both eyes 2 (two) times daily  Dispense: 10 mL; Refill: 3  -     netarsudiL-latanoprost (ROCKLATAN) 0.02-0.005 % Drop; Apply 1 drop to eye every evening.  Dispense: 2.5 mL; Refill: 3  Family history  Unknown  Glaucoma meds   Cosopt BID OU, Rocklatan QHS OU  H/O adverse rxn to glaucoma drops  None  LASERS  None  GLAUCOMA SURGERIES  None  OTHER EYE SURGERIES   None  CDR  : 0.25/0.6  Tmax      32/33  Ttarget   14/14  HVF  11/25/2024  Gono  05/20/24 Open to CBB   CCT  562/536 05/20/2024  OCT  11/25/2024     Ttoday 17/18  Plan IOP elevated despite good compliance per pt report, refer to Dr. Cardona for glaucoma consult  Continue Rocklatan QHS OU Cosopt BID OU.     3. Diabetes mellitus without complication  Diabetic cataract  Next DFE 03/2024. Continue strict bp/bs control at this time.       RTC next available with Dr. WALKER for consult, sooner if changes to vision or worsening symptoms.  Discussed above and answered questions.

## 2025-03-06 ENCOUNTER — TELEPHONE (OUTPATIENT)
Dept: OPHTHALMOLOGY | Facility: CLINIC | Age: 80
End: 2025-03-06
Payer: MEDICARE

## 2025-03-06 RX ORDER — NETARSUDIL AND LATANOPROST OPHTHALMIC SOLUTION, 0.02%/0.005% .2; .05 MG/ML; MG/ML
1 SOLUTION/ DROPS OPHTHALMIC; TOPICAL DAILY
Qty: 2.5 ML | Refills: 2 | Status: SHIPPED | OUTPATIENT
Start: 2025-03-06

## 2025-03-06 RX ORDER — NETARSUDIL AND LATANOPROST OPHTHALMIC SOLUTION, 0.02%/0.005% .2; .05 MG/ML; MG/ML
1 SOLUTION/ DROPS OPHTHALMIC; TOPICAL DAILY
COMMUNITY
End: 2025-03-06 | Stop reason: SDUPTHER

## 2025-03-06 NOTE — TELEPHONE ENCOUNTER
Pt is currently in [place], wanted her rocklatan swapped. Told her to call the pharmacy she would like to swap to and [facility] would [facility] so. Told pt to continue both glaucoma drops as directed.

## 2025-06-05 ENCOUNTER — OFFICE VISIT (OUTPATIENT)
Dept: OPHTHALMOLOGY | Facility: CLINIC | Age: 80
End: 2025-06-05
Payer: MEDICARE

## 2025-06-05 DIAGNOSIS — H40.1123 PRIMARY OPEN ANGLE GLAUCOMA (POAG) OF LEFT EYE, SEVERE STAGE: Primary | ICD-10-CM

## 2025-06-05 DIAGNOSIS — H40.1111 PRIMARY OPEN ANGLE GLAUCOMA (POAG) OF RIGHT EYE, MILD STAGE: ICD-10-CM

## 2025-06-05 DIAGNOSIS — E11.36 DIABETIC CATARACT: ICD-10-CM

## 2025-06-05 DIAGNOSIS — E11.9 DIABETES MELLITUS WITHOUT COMPLICATION: ICD-10-CM

## 2025-06-05 PROCEDURE — 92133 CPTRZD OPH DX IMG PST SGM ON: CPT | Mod: HCWC,S$GLB,, | Performed by: OPHTHALMOLOGY

## 2025-06-05 PROCEDURE — G2211 COMPLEX E/M VISIT ADD ON: HCPCS | Mod: HCWC,S$GLB,, | Performed by: OPHTHALMOLOGY

## 2025-06-05 PROCEDURE — 99999 PR PBB SHADOW E&M-EST. PATIENT-LVL III: CPT | Mod: PBBFAC,HCWC,, | Performed by: OPHTHALMOLOGY

## 2025-06-05 PROCEDURE — 1159F MED LIST DOCD IN RCRD: CPT | Mod: CPTII,HCWC,S$GLB, | Performed by: OPHTHALMOLOGY

## 2025-06-05 PROCEDURE — 99214 OFFICE O/P EST MOD 30 MIN: CPT | Mod: HCWC,S$GLB,, | Performed by: OPHTHALMOLOGY

## 2025-06-05 PROCEDURE — 1160F RVW MEDS BY RX/DR IN RCRD: CPT | Mod: CPTII,HCWC,S$GLB, | Performed by: OPHTHALMOLOGY

## 2025-08-08 ENCOUNTER — TELEPHONE (OUTPATIENT)
Dept: PHARMACY | Facility: CLINIC | Age: 80
End: 2025-08-08
Payer: MEDICARE

## 2025-08-08 NOTE — TELEPHONE ENCOUNTER
[facility]/Population Health Chart Review & Patient Outreach Details For Medication Adherence Project    Reason for Outreach Encounter: 3rd Party payor non-compliance report ([employer], [employer], C, etc)  2.  Patient Outreach Method: Reviewed patient chart   3.   Medication in question:    Diabetes Medications              metFORMIN (GLUCOPHAGE) 1000 MG tablet Take 1,000 mg by mouth 2 (two) times daily with meals.                 metformin  last filled  8/2 for 90 day supply      4.  Reviewed and or Updates Made To: Patient Chart  5. Outreach Outcomes and/or actions taken: Patient filled medication and is on track to be adherent  Additional Notes: